# Patient Record
Sex: MALE | Race: WHITE
[De-identification: names, ages, dates, MRNs, and addresses within clinical notes are randomized per-mention and may not be internally consistent; named-entity substitution may affect disease eponyms.]

---
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
## 2022-12-19 ENCOUNTER — HOSPITAL ENCOUNTER (OUTPATIENT)
Dept: HOSPITAL 47 - LABWHC1 | Age: 22
Discharge: HOME | End: 2022-12-19
Attending: PEDIATRICS
Payer: COMMERCIAL

## 2022-12-19 DIAGNOSIS — K86.89: ICD-10-CM

## 2022-12-19 DIAGNOSIS — E84.19: ICD-10-CM

## 2022-12-19 DIAGNOSIS — E55.9: ICD-10-CM

## 2022-12-19 DIAGNOSIS — E84.9: Primary | ICD-10-CM

## 2022-12-19 LAB
ALBUMIN SERPL-MCNC: 4.5 G/DL (ref 3.8–4.9)
ALBUMIN/GLOB SERPL: 1.3 G/DL (ref 1.6–3.17)
ALP SERPL-CCNC: 121 U/L (ref 41–126)
ALT SERPL-CCNC: 16 U/L (ref 10–49)
ANION GAP SERPL CALC-SCNC: 10.6 MMOL/L (ref 10–18)
APTT BLD: 28.1 SEC (ref 22–30)
AST SERPL-CCNC: 15 U/L (ref 14–35)
BASOPHILS # BLD AUTO: 0.03 X 10*3/UL (ref 0–0.1)
BASOPHILS NFR BLD AUTO: 0.5 %
BUN SERPL-SCNC: 13.6 MG/DL (ref 9–27)
BUN/CREAT SERPL: 17.26 RATIO (ref 12–20)
CALCIUM SPEC-MCNC: 9.4 MG/DL (ref 8.7–10.3)
CHLORIDE SERPL-SCNC: 100 MMOL/L (ref 96–109)
CO2 SERPL-SCNC: 28.7 MMOL/L (ref 20–27.5)
EOSINOPHIL # BLD AUTO: 0.11 X 10*3/UL (ref 0.04–0.35)
EOSINOPHIL NFR BLD AUTO: 1.8 %
ERYTHROCYTE [DISTWIDTH] IN BLOOD BY AUTOMATED COUNT: 5.34 X 10*6/UL (ref 4.4–5.6)
ERYTHROCYTE [DISTWIDTH] IN BLOOD: 13.4 % (ref 11.5–14.5)
GGT SERPL-CCNC: 20 U/L (ref 0–73)
GLOBULIN SER CALC-MCNC: 3.4 G/DL (ref 1.6–3.3)
GLUCOSE SERPL-MCNC: 84 MG/DL (ref 70–110)
HCT VFR BLD AUTO: 46.9 % (ref 39.6–50)
HGB BLD-MCNC: 16.2 G/DL (ref 13–17)
IMM GRANULOCYTES BLD QL AUTO: 0.3 %
INR PPP: 1 (ref ?–1.2)
LYMPHOCYTES # SPEC AUTO: 1.17 X 10*3/UL (ref 0.9–5)
LYMPHOCYTES NFR SPEC AUTO: 18.8 %
MCH RBC QN AUTO: 30.3 PG (ref 27–32)
MCHC RBC AUTO-ENTMCNC: 34.5 G/DL (ref 32–37)
MCV RBC AUTO: 87.8 FL (ref 80–97)
MONOCYTES # BLD AUTO: 0.67 X 10*3/UL (ref 0.2–1)
MONOCYTES NFR BLD AUTO: 10.8 %
NEUTROPHILS # BLD AUTO: 4.22 X 10*3/UL (ref 1.8–7.7)
NEUTROPHILS NFR BLD AUTO: 67.8 %
NRBC BLD AUTO-RTO: 0 /100 WBCS (ref 0–0)
PLATELET # BLD AUTO: 206 X 10*3/UL (ref 140–440)
POTASSIUM SERPL-SCNC: 4.4 MMOL/L (ref 3.5–5.5)
PROT SERPL-MCNC: 7.9 G/DL (ref 6.2–8.2)
PT BLD: 10.7 SEC (ref 9–12)
SODIUM SERPL-SCNC: 139 MMOL/L (ref 135–145)
WBC # BLD AUTO: 6.22 X 10*3/UL (ref 4.5–10)

## 2022-12-19 PROCEDURE — 84446 ASSAY OF VITAMIN E: CPT

## 2022-12-19 PROCEDURE — 83036 HEMOGLOBIN GLYCOSYLATED A1C: CPT

## 2022-12-19 PROCEDURE — 80053 COMPREHEN METABOLIC PANEL: CPT

## 2022-12-19 PROCEDURE — 85730 THROMBOPLASTIN TIME PARTIAL: CPT

## 2022-12-19 PROCEDURE — 85025 COMPLETE CBC W/AUTO DIFF WBC: CPT

## 2022-12-19 PROCEDURE — 82306 VITAMIN D 25 HYDROXY: CPT

## 2022-12-19 PROCEDURE — 82977 ASSAY OF GGT: CPT

## 2022-12-19 PROCEDURE — 85610 PROTHROMBIN TIME: CPT

## 2022-12-19 PROCEDURE — 36415 COLL VENOUS BLD VENIPUNCTURE: CPT

## 2022-12-19 PROCEDURE — 82785 ASSAY OF IGE: CPT

## 2022-12-19 PROCEDURE — 84590 ASSAY OF VITAMIN A: CPT
